# Patient Record
Sex: FEMALE | Race: WHITE | Employment: UNEMPLOYED | URBAN - METROPOLITAN AREA
[De-identification: names, ages, dates, MRNs, and addresses within clinical notes are randomized per-mention and may not be internally consistent; named-entity substitution may affect disease eponyms.]

---

## 2021-02-16 ENCOUNTER — APPOINTMENT (OUTPATIENT)
Dept: GENERAL RADIOLOGY | Facility: CLINIC | Age: 56
End: 2021-02-16
Attending: EMERGENCY MEDICINE
Payer: COMMERCIAL

## 2021-02-16 ENCOUNTER — HOSPITAL ENCOUNTER (EMERGENCY)
Facility: CLINIC | Age: 56
Discharge: HOME | End: 2021-02-16
Attending: EMERGENCY MEDICINE | Admitting: EMERGENCY MEDICINE
Payer: COMMERCIAL

## 2021-02-16 VITALS
HEART RATE: 95 BPM | SYSTOLIC BLOOD PRESSURE: 133 MMHG | OXYGEN SATURATION: 97 % | DIASTOLIC BLOOD PRESSURE: 80 MMHG | RESPIRATION RATE: 19 BRPM | TEMPERATURE: 98 F

## 2021-02-16 DIAGNOSIS — J18.9 PNEUMONIA OF LEFT LOWER LOBE DUE TO INFECTIOUS ORGANISM: ICD-10-CM

## 2021-02-16 LAB
ALBUMIN SERPL-MCNC: 3.5 G/DL (ref 3.4–5)
ALP SERPL-CCNC: 70 U/L (ref 40–150)
ALT SERPL W P-5'-P-CCNC: 126 U/L (ref 0–50)
ANION GAP SERPL CALCULATED.3IONS-SCNC: 7 MMOL/L (ref 3–14)
AST SERPL W P-5'-P-CCNC: 105 U/L (ref 0–45)
BASOPHILS # BLD AUTO: 0 10E9/L (ref 0–0.2)
BASOPHILS NFR BLD AUTO: 0.2 %
BILIRUB SERPL-MCNC: 1.3 MG/DL (ref 0.2–1.3)
BUN SERPL-MCNC: 10 MG/DL (ref 7–30)
CALCIUM SERPL-MCNC: 8.3 MG/DL (ref 8.5–10.1)
CHLORIDE SERPL-SCNC: 107 MMOL/L (ref 94–109)
CO2 SERPL-SCNC: 25 MMOL/L (ref 20–32)
CREAT SERPL-MCNC: 0.55 MG/DL (ref 0.52–1.04)
D DIMER PPP FEU-MCNC: 0.4 UG/ML FEU (ref 0–0.5)
DIFFERENTIAL METHOD BLD: ABNORMAL
EOSINOPHIL # BLD AUTO: 0.1 10E9/L (ref 0–0.7)
EOSINOPHIL NFR BLD AUTO: 0.3 %
ERYTHROCYTE [DISTWIDTH] IN BLOOD BY AUTOMATED COUNT: 12.2 % (ref 10–15)
FLUAV RNA RESP QL NAA+PROBE: NEGATIVE
FLUBV RNA RESP QL NAA+PROBE: NEGATIVE
GFR SERPL CREATININE-BSD FRML MDRD: >90 ML/MIN/{1.73_M2}
GLUCOSE SERPL-MCNC: 90 MG/DL (ref 70–99)
HCT VFR BLD AUTO: 40.7 % (ref 35–47)
HGB BLD-MCNC: 13 G/DL (ref 11.7–15.7)
IMM GRANULOCYTES # BLD: 0.1 10E9/L (ref 0–0.4)
IMM GRANULOCYTES NFR BLD: 0.6 %
LABORATORY COMMENT REPORT: NORMAL
LACTATE BLD-SCNC: 2.2 MMOL/L (ref 0.7–2)
LIPASE SERPL-CCNC: 87 U/L (ref 73–393)
LYMPHOCYTES # BLD AUTO: 1.5 10E9/L (ref 0.8–5.3)
LYMPHOCYTES NFR BLD AUTO: 9.1 %
MCH RBC QN AUTO: 31.2 PG (ref 26.5–33)
MCHC RBC AUTO-ENTMCNC: 31.9 G/DL (ref 31.5–36.5)
MCV RBC AUTO: 98 FL (ref 78–100)
MONOCYTES # BLD AUTO: 0.5 10E9/L (ref 0–1.3)
MONOCYTES NFR BLD AUTO: 3.2 %
NEUTROPHILS # BLD AUTO: 14 10E9/L (ref 1.6–8.3)
NEUTROPHILS NFR BLD AUTO: 86.6 %
NRBC # BLD AUTO: 0 10*3/UL
NRBC BLD AUTO-RTO: 0 /100
PLATELET # BLD AUTO: 217 10E9/L (ref 150–450)
POTASSIUM SERPL-SCNC: 3.5 MMOL/L (ref 3.4–5.3)
PROT SERPL-MCNC: 7.6 G/DL (ref 6.8–8.8)
RBC # BLD AUTO: 4.17 10E12/L (ref 3.8–5.2)
RSV RNA SPEC QL NAA+PROBE: NORMAL
SARS-COV-2 RNA RESP QL NAA+PROBE: NEGATIVE
SODIUM SERPL-SCNC: 139 MMOL/L (ref 133–144)
SPECIMEN SOURCE: NORMAL
TROPONIN I SERPL-MCNC: <0.015 UG/L (ref 0–0.04)
WBC # BLD AUTO: 16.1 10E9/L (ref 4–11)

## 2021-02-16 PROCEDURE — 258N000003 HC RX IP 258 OP 636: Performed by: EMERGENCY MEDICINE

## 2021-02-16 PROCEDURE — 83690 ASSAY OF LIPASE: CPT | Performed by: EMERGENCY MEDICINE

## 2021-02-16 PROCEDURE — 93005 ELECTROCARDIOGRAM TRACING: CPT

## 2021-02-16 PROCEDURE — 250N000011 HC RX IP 250 OP 636: Performed by: EMERGENCY MEDICINE

## 2021-02-16 PROCEDURE — 87636 SARSCOV2 & INF A&B AMP PRB: CPT | Performed by: EMERGENCY MEDICINE

## 2021-02-16 PROCEDURE — 96367 TX/PROPH/DG ADDL SEQ IV INF: CPT

## 2021-02-16 PROCEDURE — 85025 COMPLETE CBC W/AUTO DIFF WBC: CPT | Performed by: EMERGENCY MEDICINE

## 2021-02-16 PROCEDURE — 250N000013 HC RX MED GY IP 250 OP 250 PS 637: Performed by: EMERGENCY MEDICINE

## 2021-02-16 PROCEDURE — 36415 COLL VENOUS BLD VENIPUNCTURE: CPT | Performed by: EMERGENCY MEDICINE

## 2021-02-16 PROCEDURE — 96366 THER/PROPH/DIAG IV INF ADDON: CPT

## 2021-02-16 PROCEDURE — 96365 THER/PROPH/DIAG IV INF INIT: CPT

## 2021-02-16 PROCEDURE — 99285 EMERGENCY DEPT VISIT HI MDM: CPT | Mod: 25

## 2021-02-16 PROCEDURE — C9803 HOPD COVID-19 SPEC COLLECT: HCPCS

## 2021-02-16 PROCEDURE — 85379 FIBRIN DEGRADATION QUANT: CPT | Performed by: EMERGENCY MEDICINE

## 2021-02-16 PROCEDURE — 71045 X-RAY EXAM CHEST 1 VIEW: CPT

## 2021-02-16 PROCEDURE — 83605 ASSAY OF LACTIC ACID: CPT | Performed by: EMERGENCY MEDICINE

## 2021-02-16 PROCEDURE — 84484 ASSAY OF TROPONIN QUANT: CPT | Performed by: EMERGENCY MEDICINE

## 2021-02-16 PROCEDURE — 80053 COMPREHEN METABOLIC PANEL: CPT | Performed by: EMERGENCY MEDICINE

## 2021-02-16 RX ORDER — ALBUTEROL SULFATE 90 UG/1
6 AEROSOL, METERED RESPIRATORY (INHALATION) ONCE
Status: COMPLETED | OUTPATIENT
Start: 2021-02-16 | End: 2021-02-16

## 2021-02-16 RX ORDER — ALBUTEROL SULFATE 90 UG/1
2-4 AEROSOL, METERED RESPIRATORY (INHALATION)
Qty: 1 INHALER | Refills: 1 | Status: SHIPPED | OUTPATIENT
Start: 2021-02-16

## 2021-02-16 RX ORDER — ACETAMINOPHEN 500 MG
1000 TABLET ORAL ONCE
Status: COMPLETED | OUTPATIENT
Start: 2021-02-16 | End: 2021-02-16

## 2021-02-16 RX ORDER — AMOXICILLIN 500 MG/1
500 CAPSULE ORAL 3 TIMES DAILY
Qty: 21 CAPSULE | Refills: 0 | Status: SHIPPED | OUTPATIENT
Start: 2021-02-17 | End: 2021-02-24

## 2021-02-16 RX ORDER — CEFTRIAXONE 2 G/1
2 INJECTION, POWDER, FOR SOLUTION INTRAMUSCULAR; INTRAVENOUS ONCE
Status: COMPLETED | OUTPATIENT
Start: 2021-02-16 | End: 2021-02-16

## 2021-02-16 RX ORDER — AZITHROMYCIN 250 MG/1
500 TABLET, FILM COATED ORAL ONCE
Status: COMPLETED | OUTPATIENT
Start: 2021-02-16 | End: 2021-02-16

## 2021-02-16 RX ORDER — LIDOCAINE HYDROCHLORIDE 10 MG/ML
INJECTION, SOLUTION EPIDURAL; INFILTRATION; INTRACAUDAL; PERINEURAL
Status: DISCONTINUED
Start: 2021-02-16 | End: 2021-02-16 | Stop reason: HOSPADM

## 2021-02-16 RX ORDER — AZITHROMYCIN 250 MG/1
250 TABLET, FILM COATED ORAL DAILY
Qty: 4 TABLET | Refills: 0 | Status: SHIPPED | OUTPATIENT
Start: 2021-02-17 | End: 2021-02-21

## 2021-02-16 RX ADMIN — ALBUTEROL SULFATE 6 PUFF: 90 AEROSOL, METERED RESPIRATORY (INHALATION) at 15:20

## 2021-02-16 RX ADMIN — ACETAMINOPHEN 1000 MG: 500 TABLET, FILM COATED ORAL at 15:19

## 2021-02-16 RX ADMIN — AZITHROMYCIN MONOHYDRATE 500 MG: 250 TABLET ORAL at 17:54

## 2021-02-16 RX ADMIN — CEFTRIAXONE 2 G: 2 INJECTION, POWDER, FOR SOLUTION INTRAMUSCULAR; INTRAVENOUS at 18:05

## 2021-02-16 RX ADMIN — SODIUM CHLORIDE, POTASSIUM CHLORIDE, SODIUM LACTATE AND CALCIUM CHLORIDE 500 ML: 600; 310; 30; 20 INJECTION, SOLUTION INTRAVENOUS at 15:17

## 2021-02-16 ASSESSMENT — ENCOUNTER SYMPTOMS
VOMITING: 1
MYALGIAS: 1
COUGH: 1
HEADACHES: 0
CHILLS: 1
ABDOMINAL PAIN: 1
SHORTNESS OF BREATH: 1
DIARRHEA: 0

## 2021-02-16 NOTE — ED TRIAGE NOTES
Presents with SOB, generalized soreness, lowe grade fever and ear ache. Pt had colonoscopy yesterday and required intubation. After SpO2 >95% pt was discharged. VSS on RA at this time, SpO2 95%.

## 2021-02-16 NOTE — ED PROVIDER NOTES
History   Chief Complaint:  Shortness of Breath       HPI   Marily Chang is a 55-year-old female presenting with shortness of breath, chills, body aches since yesterday afternoon.  She had a colonoscopy at an outpatient GI clinic for her Crohn's disease and upon awakening from the sedation was vomiting.  Later that night she had shaking chills followed by generalized myalgias.  Woke up around 3 AM and has had progressive shortness of breath with exertion mild nonproductive cough.  Unsure if she has had a fever.  Vomiting was present upon waking from sedation but has not been persistent.  Lives at home with her  and he has been well recently.  She has not had COVID-19 to this point.  She is chronically on Humira for her Crohn's disease.  Associated symptoms include some lower midline chest/epigastric abdominal pain.     Review of Systems   Constitutional: Positive for chills.   Respiratory: Positive for cough and shortness of breath.    Cardiovascular: Positive for chest pain.   Gastrointestinal: Positive for abdominal pain and vomiting. Negative for diarrhea.   Musculoskeletal: Positive for myalgias.   Neurological: Negative for headaches.   All other systems reviewed and are negative.        Allergies:  Nsaids  Crisaborole  Sulfa  Sulfamethoxazole-Trimethoprim     Medications:  Aspirin 81mg   Humira Pen     Past Medical History:    Crohn's disease    Fibroids   Urinary retention   Skin carcinoma of nose  Depression   Beaver's neuroma   Vitamin D deficiency   Narcolepsy     Past Surgical History:    Anal fissure repair   Ligate fallopian tube postpartum   Appendectomy   Colposcopy x2  Robotic assisted total laparoscopic Hysterectomy   Elbow surgery (right)    Stump neuroma removal     Family History:    Father: thyroid   Mother: hyperlipidemia, thyroid   Sister: breast cancer, depression     Social History:  Works in retail   Presents alone    Physical Exam     Patient Vitals for the past 24 hrs:    BP Temp Temp src Pulse Resp SpO2   02/16/21 1814 -- -- -- 95 -- --   02/16/21 1810 133/80 -- -- 97 -- 97 %   02/16/21 1730 118/76 -- -- 100 -- 96 %   02/16/21 1600 131/76 -- -- -- -- 96 %   02/16/21 1557 -- 98  F (36.7  C) Oral -- -- --   02/16/21 1555 131/76 -- -- 91 -- 95 %   02/16/21 1550 131/76 -- -- -- -- 95 %   02/16/21 1540 -- -- -- -- -- 96 %   02/16/21 1442 128/79 99.6  F (37.6  C) Temporal 93 19 94 %       Physical Exam    HENT:  mmm, no rhinorrhea  Eyes: periorbital tissues and sclera normal   Neck: supple, no abnormal swelling  Lungs: Mild tachypnea, speaking in short sentences, lungs clear to auscultation no wheezing rhonchi or rales  CV: rrr, no m/r/g, ppi  Abd: soft, nontender, nondistended, no rebound/masses/guarding/hsm  Ext: no peripheral edema  Skin: warm, dry, well perfused, no rashes/bruising/lesions on exposed skin  Neuro: alert, MAEE, no gross motor or sensory deficits, gait stable  Psych: Normal mood, normal affect      Emergency Department Course   ECG  ECG taken at 15:22:59, ECG read at 1526  Normal sinus rhythm. ST & T wave abnormality consider anterior ischemia, abnormal ECG.   No significant change as compared to prior, dated 07/25/2013.  Rate 73 bpm. AR interval 124 ms. QRS duration 78 ms. QT/QTc 402/442 ms. P-R-T axes 41 24 -1.     Imaging:  XR Chest Port 1 View  IMPRESSION: Left lateral lung base consolidative opacity is new and  concerning for pneumonia. Follow-up radiograph after treatment should  be considered to ensure resolution. No pleural effusion or  pneumothorax. Cardiac and mediastinal silhouettes are normal.  As read by Radiology.     Laboratory:  CBC: WBC: 16.1 (H), HGB: 13.0, PLT: 217  CMP: Calcium: 8.3 (L), ALT: 126 (H), AST: 105 (H), o/w WNL (Creatinine: 0.55)  Troponin (1511): <0.015  D-dimer: 0.4  Symptomatic Influenza A/B antigen & COVID-19 PCR: negative     Lipase: 87  Lactic acid whole blood (9041): 2.2 (H)    Emergency Department Course:    Reviewed:  I reviewed  nursing notes, vitals, past medical history and care everywhere    Assessments:  1456 I obtained history and examined the patient as noted above.   1849 I rechecked the patient and explained findings prior to discharge.     Interventions:  1517 Lactated ringers bolus 500 mL IV  1519 Tylenol 1,000 mg PO   1520 Albuterol 6 puff Inhalation   1754 Zithromax 500 mg PO   1805 Rocephin 2 g IV    Disposition:  The patient was discharged to home.       Impression & Plan   Lactic acid elevated likely secondary to bronchodilators rather than underlying sepsis given the patient's appearance here.    Medical Decision Makin-year-old female 1 day post procedure from a colonoscopy which she states that she woke up vomiting and reports being intubated although later summary from that clinic text her and stated she was not intubated.  She has evidence of pneumonia on chest x-ray here.  A D-dimer is negative making PE unlikely and her cardiac work-up was also unremarkable.  Her tachypnea improved with a bronchodilator.  She was never hypoxic and on a road test did not become hypoxic and felt comfortable being discharged home.  She was started on community-acquired antibiotic coverage here and will transition to oral regimen at home.  Her PSI score is class II making her very low risk and outpatient management is appropriate.  She understands what is known and unknown at this point what to watch out for when return here to the emergency department.    Covid-19  Marily Chang was evaluated during a global COVID-19 pandemic, which necessitated consideration that the patient might be at risk for infection with the SARS-CoV-2 virus that causes COVID-19.   Applicable protocols for evaluation were followed during the patient's care.   COVID-19 was considered as part of the patient's evaluation. The plan for testing is:  a test was obtained during this visit.    Diagnosis:    ICD-10-CM    1. Pneumonia of left lower lobe due to  infectious organism  J18.9        Discharge Medications:  New Prescriptions    ALBUTEROL (PROAIR HFA/PROVENTIL HFA/VENTOLIN HFA) 108 (90 BASE) MCG/ACT INHALER    Inhale 2-4 puffs into the lungs every 2 hours as needed for shortness of breath / dyspnea    AMOXICILLIN (AMOXIL) 500 MG CAPSULE    Take 1 capsule (500 mg) by mouth 3 times daily for 7 days    AZITHROMYCIN (ZITHROMAX) 250 MG TABLET    Take 1 tablet (250 mg) by mouth daily for 4 days       Scribe Disclosure:  Melanie SAPP, am serving as a scribe at 3:03 PM on 2/16/2021 to document services personally performed by Sterling Law MD based on my observations and the provider's statements to me.             Sterling Law MD  02/16/21 1489

## 2021-02-17 LAB — INTERPRETATION ECG - MUSE: NORMAL

## 2021-02-17 NOTE — DISCHARGE INSTRUCTIONS
Return to ER immediately if you develop: worsening breathing, Fever > 101, persistent nausea or vomiting OR you have any other concerns about your health.

## 2025-07-15 ENCOUNTER — HOSPITAL ENCOUNTER (OUTPATIENT)
Age: 60
Discharge: HOME | End: 2025-07-15
Payer: COMMERCIAL

## 2025-07-15 DIAGNOSIS — M51.369: ICD-10-CM

## 2025-07-15 DIAGNOSIS — M54.16: Primary | ICD-10-CM

## 2025-07-15 PROCEDURE — 62323 NJX INTERLAMINAR LMBR/SAC: CPT
